# Patient Record
Sex: FEMALE
[De-identification: names, ages, dates, MRNs, and addresses within clinical notes are randomized per-mention and may not be internally consistent; named-entity substitution may affect disease eponyms.]

---

## 2018-01-12 ENCOUNTER — HOSPITAL ENCOUNTER (OUTPATIENT)
Dept: HOSPITAL 95 - LAB | Age: 33
Discharge: HOME | End: 2018-01-12
Payer: COMMERCIAL

## 2018-01-12 DIAGNOSIS — R30.0: Primary | ICD-10-CM

## 2018-01-12 LAB
RBC #/AREA URNS HPF: (no result) /HPF (ref 0–2)
SP GR SPEC: 1.01 (ref 1–1.02)
UROBILINOGEN UR STRIP-MCNC: (no result) MG/DL
WBC #/AREA URNS HPF: (no result) /HPF (ref 0–5)

## 2020-01-28 NOTE — NUR
LACTATION CONSULT. SHE IS EXPERIENCED WITH BF AND HANDLES BABY WELL. HAS VERY
SORE NIPPLES, TENDER SINCE BEFORE BIRTH OF BABY. MILD REDNESS AND MILD SCABS
VISIBLE. HAS AN ORDER FOR BILL'S OINTMENT AND INSTRUCT IN USE. INSTRUCT/DEMO
POSITIONING TO HELP OBTAIN A DEEPER ASYMETRIC LATCH AND THEN FURTHER WIDEN HIS
LATCH UNTIL COMFORTABLE. HE JUST FINISHED AT BREAST AND IS UNWILLING TO ROOT
AT PRESENT, ASLEEP IN HER ARMS. INSTRUCT IN CHANGES TO EXPECT DURING THE FIRST
WEEK WITH FEEDINGS AND WITH BABY. QUESTIONS ANSWERED.

## 2020-01-28 NOTE — NUR
Pt resting in bed, was sleeping at bedside report.  Denies needs beyond mild
pain in sciatic nerve.  Will call for pain medication after she finishes her
breakfast.

## 2020-01-28 NOTE — NUR
Printed d/c instructions reviewed w/pt.  Denies additional questions/concerns.
 ID bands matched w/nb and verification form. No acute changes t/o shift.  Pt
d/c'd home ambulatory to care of .

## 2023-08-23 NOTE — NUR
DISCHARGE NOTE
PT A&OX4, BREATHING RA, TOLERATING PO FLUIDS AND FOOD,  AT BEDSIDE. NO
COMPLAINTS. LAP SITES CDI C STERI STRIPS, BELLY BUTTON LAP SITE SLIGHYLY
OOZING SS FLUID, CLEANED C GAUZE AND EXTRA GAUZE PLACED OVER TOP.
Discharge instructions reviewed with patient. Patient verbalizes understanding.
Copy given to patient to take home.
Discharged via wheelchair to private car for ride home.